# Patient Record
Sex: MALE | Race: WHITE | NOT HISPANIC OR LATINO | URBAN - NONMETROPOLITAN AREA
[De-identification: names, ages, dates, MRNs, and addresses within clinical notes are randomized per-mention and may not be internally consistent; named-entity substitution may affect disease eponyms.]

---

## 2018-07-02 PROBLEM — H52.4: Noted: 2018-07-02

## 2018-07-02 PROBLEM — H43.813: Noted: 2018-07-02

## 2018-07-02 PROBLEM — H10.413: Noted: 2018-07-02

## 2018-07-02 PROBLEM — H25.13: Noted: 2018-07-02

## 2019-07-08 ENCOUNTER — IMPORTED ENCOUNTER (OUTPATIENT)
Dept: URBAN - NONMETROPOLITAN AREA CLINIC 1 | Facility: CLINIC | Age: 59
End: 2019-07-08

## 2019-07-08 PROCEDURE — S0621 ROUTINE OPHTHALMOLOGICAL EXA: HCPCS

## 2019-07-08 PROCEDURE — 92310 CONTACT LENS FITTING OU: CPT

## 2019-07-08 NOTE — PATIENT DISCUSSION
Presbyopia OUDiscussed refractive status with patient. New glasses and contact Rx given today but do not recommend updating due to cataract progression. Discussed proper care replacement and hygiene. Continue to monitor. PVD OUDiscussed findings of exam in detail with the patient. The risk of retinal detachment in patients with PVDs was discussed with the patient and the warning signs of retinal detachment were carefully reviewed with the patient. The patient was warned to return to the office or contact the ophthalmologist on call immediately if they experience signs of retinal detachmentContinue to [place] OUDiscussed diagnosis with patient. Reviewed symptoms related to cataract progression. Discussed various treatment options with patient. Recommend refer to Dr. Rogerio Molina for evaluation. Patient agrees with plan will scheduleAllergies OUDiscussed diagnosis in detail with patient. Continue Bepreve BID OU/PRN. Continue to monitor.; 's Notes:  7/8/19DFE 9/21/17

## 2019-09-18 ENCOUNTER — IMPORTED ENCOUNTER (OUTPATIENT)
Dept: URBAN - NONMETROPOLITAN AREA CLINIC 1 | Facility: CLINIC | Age: 59
End: 2019-09-18

## 2019-09-18 PROBLEM — H43.813: Noted: 2019-09-18

## 2019-09-18 PROBLEM — H52.4: Noted: 2019-09-18

## 2019-09-18 PROBLEM — H25.13: Noted: 2019-09-18

## 2019-09-18 PROBLEM — H10.413: Noted: 2018-07-02

## 2019-09-18 PROCEDURE — 99214 OFFICE O/P EST MOD 30 MIN: CPT

## 2019-09-18 NOTE — PATIENT DISCUSSION
Cataract(s)-Visually significant cataract OU .-Cataract(s) causing symptomatic impairment of visual function not correctable with a tolerable change in glasses or contact lenses lighting or non-operative means resulting in specific activity limitations and/or participation restrictions including but not limited to reading viewing television driving or meeting vocational or recreational needs. -Expectation is clearer vision and functional improvement in symptoms as well as reduced glare disability after cataract removal.-Order IOLMaster and OPD today. -Recommend Toric IOL OU/ LenSx based on today's OPD testing and lifestyle questionnaire.-All questions were answered regarding surgery including pre and post-op medications appointments activity restrictions and anesthetic usage.-The risks benefits and alternatives and special risk factors for the patient were discussed in detail including but not limited to: bleeding infection retinal detachment vitreous loss problems with the implant and possible need for additional surgery.-Although rare the possibility of complete vision loss was discussed.-The possible need for glasses post-operatively was discussed.-Order medical clearance exam based on history of age-Patient elects to proceed with cataract surgery OD . Will schedule at patient's convenience and re-evaluate OS  in the future. **Doctor Recommendations**- Patient has more astigmatism OS than OD - Discsused LenSx surgery in detail with patient - Discussed Standard implants for OU - Discussed Toric implant OU based on today's testing however patient wore contacts into the office today. - Discussed the need for glasses up close patient states understanding. Informed patient he could wear a contact lens for up close if he wishes and do both eyes for distance. - Discussed monovision in detail with patient patient states he is happy with monovision with contacts and wishes to be out of contacts. 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 Discussed with patient if he does permanent monovision then he will be fixed on specific distances and will not have a range. - Target -2.25 OS for reading. - Patient elects to proceed with LenSx OU Toric OU OD distance OS near - Recommend patient d/c contacts for weekend take them out on Friday and come in on a Monday for repeat topography.  Patient ag; 's Notes: MR 7/8/19DFE 9/21/17

## 2019-10-28 ENCOUNTER — IMPORTED ENCOUNTER (OUTPATIENT)
Dept: URBAN - NONMETROPOLITAN AREA CLINIC 1 | Facility: CLINIC | Age: 59
End: 2019-10-28

## 2019-10-28 PROBLEM — E78.5: Noted: 2019-10-28

## 2019-10-28 PROBLEM — I10: Noted: 2019-10-28

## 2019-10-28 PROBLEM — Z01.818: Noted: 2019-10-28

## 2019-10-28 PROCEDURE — 99214 OFFICE O/P EST MOD 30 MIN: CPT

## 2019-10-28 NOTE — PATIENT DISCUSSION
Medical Clearance-Medical clearance done today. -No outstanding concerns that would preclude surgery.-Patient is cleared to proceed with scheduled surgery.; 's Notes: MR 7/8/19DFE 9/21/17

## 2019-11-06 ENCOUNTER — IMPORTED ENCOUNTER (OUTPATIENT)
Dept: URBAN - NONMETROPOLITAN AREA CLINIC 1 | Facility: CLINIC | Age: 59
End: 2019-11-06

## 2019-11-06 PROBLEM — H25.12: Noted: 2019-11-06

## 2019-11-06 PROBLEM — H26.491: Noted: 2019-11-06

## 2019-11-06 PROBLEM — Z98.41: Noted: 2019-11-06

## 2019-11-06 PROCEDURE — 99024 POSTOP FOLLOW-UP VISIT: CPT

## 2019-11-06 NOTE — PATIENT DISCUSSION
1 Day POV CE OD 11/5/19 Toric by LenSx- Discussed diagnosis in detail with patient- Patient is stable and doing well- Wound intact- Continue all post op drops as directed- PCO OD noted but stable and no treatment needed at this timr - Continue to monitor- RTC A/S with Dr. Mery Santos; 's Notes: MR 7/8/19DFE 9/21/17

## 2019-11-12 ENCOUNTER — IMPORTED ENCOUNTER (OUTPATIENT)
Dept: URBAN - NONMETROPOLITAN AREA CLINIC 1 | Facility: CLINIC | Age: 59
End: 2019-11-12

## 2019-11-12 PROCEDURE — 99024 POSTOP FOLLOW-UP VISIT: CPT

## 2019-11-12 PROCEDURE — 99214 OFFICE O/P EST MOD 30 MIN: CPT

## 2019-11-12 NOTE — PATIENT DISCUSSION
1 week POV CE OD Toric by LenSx (Distance)- OD looks good on today's exam - Patient is nearsighted and retina is stretched secondary to that. Reviewed mac OCT today with patient. No signs of retinal issues. - No residual astigmatism OD s/p CE OD. - Reviewed patients chart thoroughly and in detail with patient. - Discussed with patient that he is nearsighted s/p surgery. Discussed different options in detail with patient. - MR done today OD. -4.00 SPH.     - Discussed different sotelo and switching implants     - Discussed LASIK/Excimer laser @ N/C in 1 month for OD. Recommend this for OD.     - Discussed OD near and OS distance but I do not recommend this and recommend proceeding with keeping OD distance and OS near. - Recommend holding off on surgery OS at this time. - Repeat testing done OS today and numbers are different than testing done- Will discuss with Felix- Recommend repeat testing OS and keep it NEAR. - While we wait to do laser on the right eye may give patient contact lens in the mean time. Will need repeat brian and dilated MR for OD prior to scheduling laser. - Will make some glasses for patient (@ N/C) to help correct both eyes for distance. Will have them put new lenses in patients old frames. Will have Pradeep order lens.  Dasha Kelley to call patient tomorrow and discuss with patient when he was wearing contacts prior to testing repeat testing and today's testing.; 's Notes:  7/8/19DFE 9/21/17

## 2019-12-11 ENCOUNTER — IMPORTED ENCOUNTER (OUTPATIENT)
Dept: URBAN - NONMETROPOLITAN AREA CLINIC 1 | Facility: CLINIC | Age: 59
End: 2019-12-11

## 2019-12-11 NOTE — PATIENT DISCUSSION
1 Month POV CE OD Toric by LenSx (Distance)- OD looks good on today's exam - Patient is nearsighted and retina is stretched secondary to that. Reviewed mac    OCT previously with patient. No signs of retinal issues. - No residual astigmatism OD s/p CE OD. - Reviewed patients chart thoroughly and in detail with patient. - Discussed with patient that he is nearsighted s/p surgery. Discussed   different options in detail with patient. - MR done today OD. -4.00 SPH.      - Discussed different sotelo and switching implants     - Discussed LASIK/Excimer laser @ N/C in 1 month for OD. Recommend this for        OD. - Repeat testing done OS today and numbers are consistent from previous. - Will discuss with Felix. Undilated MR done today per Felix request. Will realy      this info to Felix-Discussed with patient we can correct OD for near and when I do cataract  surgery I can do OS distance. Also discussed doing OD for distance and OS for Near. Patient expressed he has no preference he states he wants Mono vision but does not have a preference on which eye is set for distance and which is for near.-I recommend doing [address] OD and target -2.25 @ no charge. Discussed this with patient and Geno Shipman present in the exam room along with Donavan Louis. Discussed proceeding with CE OS and correcting for distance. Patient stated that was fine. We will do [address] OD first before cataract surgery OS. Patient still qualifys for Toric/ LenSx OS. -Written Valium RX and Vicodin RX was given today by Donavan Louis. Instuctions to not take either until day of procedure and to bring in with him he expressed understanding. Patient was very pleased at todays visit with the staff and the treatment he is receiving. Informed patient his satisfication and vision is our main concern.  ****Target -2.25 OD with PRK/ OS DISTANCE with Toric/LenSx after PRK OD******; 's Notes: MR 7/8/19DFE 9/21/17

## 2019-12-12 PROBLEM — H26.491: Noted: 2019-12-12

## 2019-12-12 PROBLEM — H25.12: Noted: 2019-12-12

## 2019-12-12 PROBLEM — Z98.41: Noted: 2019-11-06

## 2019-12-18 ENCOUNTER — IMPORTED ENCOUNTER (OUTPATIENT)
Dept: URBAN - NONMETROPOLITAN AREA CLINIC 1 | Facility: CLINIC | Age: 59
End: 2019-12-18

## 2019-12-18 NOTE — PATIENT DISCUSSION
[address] enhacement OD - Near [place]- discussed risk and benefits with patient- Before procedure discussed with patient that this will now be his reading eye and when we proceed with cataract surgery on the left eye it will be his Distance eye. Patient voiced understanding. Wants to proceed with [address] surgery. [address] done w/o any complications. patient to come back for 1 day POV then daily until BCL is removed. see Dr. Colin Reyes in one month for f/u and then will schedule cataract surgery at that time if we are ready to proceed. patient voiced understanding. --------------- notes below are from last visit --------------------1 Month POV CE OD Toric by LenSx (Distance)- OD looks good on today's exam - Patient is nearsighted and retina is stretched secondary to that. Reviewed mac    OCT previously with patient. No signs of retinal issues. - No residual astigmatism OD s/p CE OD. - Reviewed patients chart thoroughly and in detail with patient. - Discussed with patient that he is nearsighted s/p surgery. Discussed   different options in detail with patient. - MR done today OD. -4.00 SPH.      - Discussed different sotelo and switching implants     - Discussed LASIK/Excimer laser @ N/C in 1 month for OD. Recommend this for        OD. - Repeat testing done OS today and numbers are consistent from previous. - Will discuss with Felix. Undilated MR done today per Felix request. Will realy      this info to Felix-Discussed with patient we can correct OD for near and when I do cataract  surgery I can do OS distance. Also discussed doing OD for distance and OS for Near. Patient expressed he has no preference he states he wants Mono vision but does not have a preference on which eye is set for distance and which is for near.-I recommend doing [address] OD and target -2.25 @ no charge. Discussed this with patient and Taryn West present in the exam room along with Valentina Chandler. Discussed proceeding with CE OS and correcting for distance. Patient stated that was fine. We will do [address] OD first before cataract surgery OS. Patient still qualifys for Toric/ LenSx OS. -Written Valium RX and Vicodin RX was given today by Valentina Chandler. Instuctions to not take either until day of procedure and to bring in with him he expressed understanding. Patient was very pleased at todays visit with the staff and the treatment he is receiving. Informed patient his satisfication and vision is our main concern.  ****Target -2.25 OD with PRK/ OS DISTANCE with Toric/LenSx after PRK OD******; 's Notes: MR 7/8/19DFE 9/21/17

## 2019-12-19 ENCOUNTER — IMPORTED ENCOUNTER (OUTPATIENT)
Dept: URBAN - NONMETROPOLITAN AREA CLINIC 1 | Facility: CLINIC | Age: 59
End: 2019-12-19

## 2019-12-19 PROBLEM — H26.491: Noted: 2019-12-12

## 2019-12-19 PROBLEM — H25.12: Noted: 2019-12-12

## 2019-12-19 PROBLEM — Z98.890: Noted: 2019-12-19

## 2019-12-19 PROCEDURE — 99024 POSTOP FOLLOW-UP VISIT: CPT

## 2019-12-19 NOTE — PATIENT DISCUSSION
1 day PO PRK OD-  discussed findings w/ pt today-  signs/symptoms associated discussed-  BCL in place will leave there today-  continue postop regimen-  RTC Monday with Rhodes--------------- notes below are from last visit --------------------1 Month POV CE OD Toric by LenSx (Distance)- OD looks good on today's exam - Patient is nearsighted and retina is stretched secondary to that. Reviewed mac    OCT previously with patient. No signs of retinal issues. - No residual astigmatism OD s/p CE OD. - Reviewed patients chart thoroughly and in detail with patient. - Discussed with patient that he is nearsighted s/p surgery. Discussed   different options in detail with patient. - MR done today OD. -4.00 SPH.      - Discussed different sotelo and switching implants     - Discussed LASIK/Excimer laser @ N/C in 1 month for OD. Recommend this for        OD. - Repeat testing done OS today and numbers are consistent from previous. - Will discuss with Felix. Undilated MR done today per Felix request. Will realy      this info to Felix-Discussed with patient we can correct OD for near and when I do cataract  surgery I can do OS distance. Also discussed doing OD for distance and OS for Near. Patient expressed he has no preference he states he wants Mono vision but does not have a preference on which eye is set for distance and which is for near.-I recommend doing [address] OD and target -2.25 @ no charge. Discussed this with patient and Yovanny Vivar present in the exam room along with Jennifer Carlisle. Discussed proceeding with CE OS and correcting for distance. Patient stated that was fine. We will do [address] OD first before cataract surgery OS. Patient still qualifys for Toric/ LenSx OS. -Written Valium RX and Vicodin RX was given today by Jennifer Carlisle. Instuctions to not take either until day of procedure and to bring in with him he expressed understanding. Patient was very pleased at todays visit with the staff and the treatment he is receiving. Informed patient his satisfication and vision is our main concern.  ****Target -2.25 OD with PRK/ OS DISTANCE with Toric/LenSx after PRK OD******; 's Notes: MR 7/8/19DFE 9/21/17
Adequate: hears normal conversation without difficulty

## 2019-12-23 ENCOUNTER — IMPORTED ENCOUNTER (OUTPATIENT)
Dept: URBAN - NONMETROPOLITAN AREA CLINIC 1 | Facility: CLINIC | Age: 59
End: 2019-12-23

## 2019-12-23 PROBLEM — Z98.890: Noted: 2019-12-23

## 2019-12-23 PROBLEM — H25.812: Noted: 2020-01-29

## 2019-12-23 PROBLEM — H52.11: Noted: 2019-12-23

## 2019-12-23 PROCEDURE — 99024 POSTOP FOLLOW-UP VISIT: CPT

## 2019-12-23 NOTE — PATIENT DISCUSSION
5 day POV PRK OD 12/18/19  (near vision)- Discussed diagnosis in detail with patient. - Patient doing well. - Replaced BCL OD today. - Trial CL given today for OS for distance. (B&L Ultra for Astigmatism)- Patient to come back for 1 day POV then daily until BCL is removed. - See Dr. Calvin Benito in one month for f/u and then will schedule cataract surgery at that time if we are ready to proceed- Continue post op drops as directed. - Continue to monitor.- F/U Thurs or Friday for POV --------------- notes below are from last visit --------------------1 Month POV CE OD Toric by LenSx (Distance)- OD looks good on today's exam - Patient is nearsighted and retina is stretched secondary to that. Reviewed mac    OCT previously with patient. No signs of retinal issues. - No residual astigmatism OD s/p CE OD. - Reviewed patients chart thoroughly and in detail with patient. - Discussed with patient that he is nearsighted s/p surgery. Discussed   different options in detail with patient. - MR done today OD. -4.00 SPH.      - Discussed different sotelo and switching implants     - Discussed LASIK/Excimer laser @ [facility] in 1 month for OD. Recommend this for        OD. - Repeat testing done OS today and numbers are consistent from previous. - Will discuss with Felix. Undilated MR done today per Felix request. Will realy      this info to Felix-Discussed with patient we can correct OD for near and when I do cataract  surgery I can do OS distance. Also discussed doing OD for distance and OS for Near. Patient expressed he has no preference he states he wants Mono vision but does not have a preference on which eye is set for distance and which is for near.-I recommend doing [address] OD and target -2.25 @ no charge. Discussed this with patient and Vahe Bill present in the exam room along with Yanci Keita. Discussed proceeding with CE OS and correcting for distance. Patient stated that was fine. We will do [address] OD first before cataract surgery OS. Patient still qualifys for Toric/ LenSx OS. -Written Valium RX and Vicodin RX was given today by Yanci Keita. Instuctions to not take either until day of procedure and to bring in with him he expressed understanding. Patient was very pleased at todays visit with the staff and the treatment he is receiving. Informed patient his satisfication and vision is our main concern.  ****Target -2.25 OD with PRK/ OS DISTANCE with Toric/LenSx after PRK OD******; 's Notes: MR 7/8/19DFE 9/21/17

## 2019-12-27 ENCOUNTER — IMPORTED ENCOUNTER (OUTPATIENT)
Dept: URBAN - NONMETROPOLITAN AREA CLINIC 1 | Facility: CLINIC | Age: 59
End: 2019-12-27

## 2019-12-27 PROCEDURE — 99024 POSTOP FOLLOW-UP VISIT: CPT

## 2019-12-27 NOTE — PATIENT DISCUSSION
9 day POV PRK OD 12/18/19  (near vision)- Discussed diagnosis in detail with patient. - Patient is stable and doing well. - Replaced BCL OD today with B&L Ultra -2.00 BC 8.5 [place] 14.2 LOT [id number] EX[facility] See Dr. Oral Merritt in one month for f/u and then will schedule cataract surgery at that time if we are ready to proceed- Continue post op drops as directed explained to patient that he should be tapering drops - Continue to monitor.- RTC Monday for F/U --------------- notes below are from last visit --------------------1 Month POV CE OD Toric by LenSx (Distance)- OD looks good on today's exam - Patient is nearsighted and retina is stretched secondary to that. Reviewed mac    OCT previously with patient. No signs of retinal issues. - No residual astigmatism OD s/p CE OD. - Reviewed patients chart thoroughly and in detail with patient. - Discussed with patient that he is nearsighted s/p surgery. Discussed   different options in detail with patient. - MR done today OD. -4.00 SPH.      - Discussed different sotelo and switching implants     - Discussed LASIK/Excimer laser @ N/C in 1 month for OD. Recommend this for        OD. - Repeat testing done OS today and numbers are consistent from previous. - Will discuss with Felix. Undilated MR done today per Felix request. Will realy      this info to Felix-Discussed with patient we can correct OD for near and when I do cataract  surgery I can do OS distance. Also discussed doing OD for distance and OS for Near. Patient expressed he has no preference he states he wants Mono vision but does not have a preference on which eye is set for distance and which is for near.-I recommend doing [address] OD and target -2.25 @ no charge. Discussed this with patient and Barb Puri present in the exam room along with Carmelita Nobles. Discussed proceeding with CE OS and correcting for distance. Patient stated that was fine. We will do [address] OD first before cataract surgery OS. Patient still qualifys for Toric/ LenSx OS. -Written Valium RX and Vicodin RX was given today by Carmelita Nobles. Instuctions to not take either until day of procedure and to bring in with him he expressed understanding. Patient was very pleased at todays visit with the staff and the treatment he is receiving. Informed patient his satisfication and vision is our main concern.  ****Target -2.25 OD with PRK/ OS DISTANCE with Toric/LenSx after PRK OD******; 's Notes: MR 7/8/19DFE 9/21/17

## 2019-12-30 ENCOUNTER — IMPORTED ENCOUNTER (OUTPATIENT)
Dept: URBAN - NONMETROPOLITAN AREA CLINIC 1 | Facility: CLINIC | Age: 59
End: 2019-12-30

## 2019-12-30 PROCEDURE — 99024 POSTOP FOLLOW-UP VISIT: CPT

## 2019-12-30 PROCEDURE — 92071 CONTACT LENS FITTING FOR TX: CPT

## 2019-12-30 NOTE — PATIENT DISCUSSION
9 day POV PRK OD 12/18/19  (near vision)- Discussed diagnosis in detail with patient.- Replaced BCL OD today with B&L Ultra -2.00 BC 8.5 [place] 14.2 LOT [id number] EXP [facility] 20[facility]25- Also gave patient a B&L Ultra Toric lenses for OS as well- See Dr. Colin Reyes in one month for f/u and then will schedule cataract surgery at that time if we are ready to proceed- Continue post op drops as directed explained to patient that he should be tapering drops - Continue to monitor.- RTC 1 week for F/U --------------- notes below are from last visit --------------------1 Month POV CE OD Toric by LenSx (Distance)- OD looks good on today's exam - Patient is nearsighted and retina is stretched secondary to that. Reviewed mac    OCT previously with patient. No signs of retinal issues. - No residual astigmatism OD s/p CE OD. - Reviewed patients chart thoroughly and in detail with patient. - Discussed with patient that he is nearsighted s/p surgery. Discussed   different options in detail with patient. - MR done today OD. -4.00 SPH.      - Discussed different sotelo and switching implants     - Discussed LASIK/Excimer laser @ N/[facility] in 1 month for OD. Recommend this for        OD. - Repeat testing done OS today and numbers are consistent from previous. - Will discuss with Felix. Undilated MR done today per Felix request. Will realy      this info to Felix-Discussed with patient we can correct OD for near and when I do cataract  surgery I can do OS distance. Also discussed doing OD for distance and OS for Near. Patient expressed he has no preference he states he wants Mono vision but does not have a preference on which eye is set for distance and which is for near.-I recommend doing [address] OD and target -2.25 @ no charge. Discussed this with patient and Taryn West present in the exam room along with Valentina Chandler. Discussed proceeding with CE OS and correcting for distance. Patient stated that was fine. We will do [address] OD first before cataract surgery OS. Patient still qualifys for Toric/ LenSx OS. -Written Valium RX and Vicodin RX was given today by Valentina Chandler. Instuctions to not take either until day of procedure and to bring in with him he expressed understanding. Patient was very pleased at todays visit with the staff and the treatment he is receiving. Informed patient his satisfication and vision is our main concern.  ****Target -2.25 OD with PRK/ OS DISTANCE with Toric/LenSx after PRK OD******; 's Notes: MR 7/8/19DFE 9/21/17

## 2020-01-06 ENCOUNTER — IMPORTED ENCOUNTER (OUTPATIENT)
Dept: URBAN - NONMETROPOLITAN AREA CLINIC 1 | Facility: CLINIC | Age: 60
End: 2020-01-06

## 2020-01-06 PROCEDURE — V2521 CNTCT LENS HYDROPHILIC TORIC: HCPCS

## 2020-01-06 PROCEDURE — V2520 CONTACT LENS HYDROPHILIC: HCPCS

## 2020-01-06 NOTE — PATIENT DISCUSSION
9 day POV PRK OD 12/18/19  (near vision)- Discussed diagnosis in detail with patient. - Patient okay with OD B&L Ultra -2.00 - Also gave patient a B&L Ultra Toric lenses for OS as well -3.00 -2.25 x 180- See Dr. Dania Krishnan in one month for f/u and then will schedule cataract surgery at that time if we are ready to proceed- Continue tapering drops- Explained to patient not to wear CLS for 3-4 days before seeing Dr. Dwight Rahman - Continue to monitor.- N/A with Dr. Dania Krishnan for cataract eval OS--------------- notes below are from last visit --------------------1 Month POV CE OD Toric by LenSx (Distance)- OD looks good on today's exam - Patient is nearsighted and retina is stretched secondary to that. Reviewed mac    OCT previously with patient. No signs of retinal issues. - No residual astigmatism OD s/p CE OD. - Reviewed patients chart thoroughly and in detail with patient. - Discussed with patient that he is nearsighted s/p surgery. Discussed   different options in detail with patient. - MR done today OD. -4.00 SPH.      - Discussed different sotelo and switching implants     - Discussed LASIK/Excimer laser @ [facility] in 1 month for OD. Recommend this for        OD. - Repeat testing done OS today and numbers are consistent from previous. - Will discuss with Felix. Undilated MR done today per Felix request. Will realy      this info to Felix-Discussed with patient we can correct OD for near and when I do cataract  surgery I can do OS distance. Also discussed doing OD for distance and OS for Near. Patient expressed he has no preference he states he wants Mono vision but does not have a preference on which eye is set for distance and which is for near.-I recommend doing [address] OD and target -2.25 @ no charge. Discussed this with patient and Jana Ceballos present in the exam room along with Isreal Hubbard. Discussed proceeding with CE OS and correcting for distance. Patient stated that was fine. We will do [address] OD first before cataract surgery OS. Patient still qualifys for Toric/ LenSx OS. -Written Valium RX and Vicodin RX was given today by Isreal Hubbard. Instuctions to not take either until day of procedure and to bring in with him he expressed understanding. Patient was very pleased at todays visit with the staff and the treatment he is receiving. Informed patient his satisfication and vision is our main concern.  ****Target -2.25 OD with PRK/ OS DISTANCE with Toric/LenSx after PRK OD******; 's Notes: MR 7/8/19DFE 9/21/17

## 2020-01-29 ENCOUNTER — IMPORTED ENCOUNTER (OUTPATIENT)
Dept: URBAN - NONMETROPOLITAN AREA CLINIC 1 | Facility: CLINIC | Age: 60
End: 2020-01-29

## 2020-01-29 PROCEDURE — 99024 POSTOP FOLLOW-UP VISIT: CPT

## 2020-01-29 NOTE — PATIENT DISCUSSION
1.5 Month POV s/p ORK OD -Patient is doing well s/p-OD looks well on todays exam -Patient has been wearing CL to correct him for distance. Dicussed this in exam room today with Dr. Venus Seals present as well who gave him the contact lens. -[facility] checked today 20/20 uncorrected.  -Patient come back in n/a for repeat cat eval OS and shoot for -2.25 OS for distance; 's Notes: MR 7/8/19DFE 9/21/17

## 2020-02-04 ENCOUNTER — IMPORTED ENCOUNTER (OUTPATIENT)
Dept: URBAN - NONMETROPOLITAN AREA CLINIC 1 | Facility: CLINIC | Age: 60
End: 2020-02-04

## 2020-02-04 PROCEDURE — 99024 POSTOP FOLLOW-UP VISIT: CPT

## 2020-02-04 NOTE — PATIENT DISCUSSION
1.5 Month POV s/p ORK OD -Patient is doing well s/p-OD looks well on todays exam Cataract(s)-Visually significant cataract OS . -Cataract(s) causing symptomatic impairment of visual function not correctable with a tolerable change in glasses or contact lenses lighting or non-operative means resulting in specific activity limitations and/or participation restrictions including but not limited to reading viewing television driving or meeting vocational or recreational needs. -Expectation is clearer vision and functional improvement in symptoms as well as reduced glare disability after cataract removal.-Order IOLMaster and OPD today. -Recommend Toric IOL (Target -2.00) based on today's OPD testing and lifestyle questionnaire.-All questions were answered regarding surgery including pre and post-op medications appointments activity restrictions and anesthetic usage.-The risks benefits and alternatives and special risk factors for the patient were discussed in detail including but not limited to: bleeding infection retinal detachment vitreous loss problems with the implant and possible need for additional surgery.-Although rare the possibility of complete vision loss was discussed.-The possible need for glasses post-operatively was discussed.-Order medical clearance exam based on history of age-Patient elects to proceed with cataract surgery OS .  Will schedule at patient's convenience *Discussed Toric implant OS *Recommend target OS -2.00; 's Notes: MR 7/8/19DFE 9/21/17

## 2020-02-17 ENCOUNTER — IMPORTED ENCOUNTER (OUTPATIENT)
Dept: URBAN - NONMETROPOLITAN AREA CLINIC 1 | Facility: CLINIC | Age: 60
End: 2020-02-17

## 2020-02-17 PROBLEM — I10: Noted: 2020-02-17

## 2020-02-17 PROBLEM — E78.5: Noted: 2020-02-17

## 2020-02-17 PROBLEM — Z01.818: Noted: 2020-02-17

## 2020-03-11 ENCOUNTER — IMPORTED ENCOUNTER (OUTPATIENT)
Dept: URBAN - NONMETROPOLITAN AREA CLINIC 1 | Facility: CLINIC | Age: 60
End: 2020-03-11

## 2020-03-11 PROBLEM — H26.491: Noted: 2020-03-11

## 2020-03-11 PROBLEM — Z98.42: Noted: 2020-03-11

## 2020-03-11 PROBLEM — Z98.41: Noted: 2020-03-11

## 2020-03-11 PROCEDURE — 99024 POSTOP FOLLOW-UP VISIT: CPT

## 2020-03-11 NOTE — PATIENT DISCUSSION
1 day POV CE OS 3/10/20 Toric by [employer] (near vision)- Discussed diagnosis in detail with patient- Patient is stable and doing well- Wound intact- Continue all post op drops as directed- PCO OD noted but stable and no treatment needed at this time - Instilled a drop of Alphagan in OS today pressure was 23- Recommend wearing CL in for next visit - Continue to monitor- RTC 1 week POV; 's Notes: MR 7/8/19DFE 9/21/17

## 2020-03-16 ENCOUNTER — IMPORTED ENCOUNTER (OUTPATIENT)
Dept: URBAN - NONMETROPOLITAN AREA CLINIC 1 | Facility: CLINIC | Age: 60
End: 2020-03-16

## 2020-03-16 PROCEDURE — V2520 CONTACT LENS HYDROPHILIC: HCPCS

## 2020-03-16 PROCEDURE — 99024 POSTOP FOLLOW-UP VISIT: CPT

## 2020-03-16 NOTE — PATIENT DISCUSSION
6 day POV CE OS 3/10/20 Toric by [employer] (near vision)- Discussed diagnosis in detail with patient- Patient is stable and doing well- Wound intact- Continue all post op drops as directed- PCO OD noted but stable and no treatment needed at this time - Continue wearing CL for OD CL Rx given today- Continue to monitor- RTC 3 week POV; 's Notes: MR 7/8/19DFE 9/21/17

## 2022-04-09 ASSESSMENT — VISUAL ACUITY
OS_GLARE: 20/40-2
OD_GLARE: 20/40+2
OD_SC: 20/30
OD_SC: 20/20
OD_PH: 20/100
OD_CC: 20/20
OS_AM: 20/20
OS_SC: 20/400 SC
OS_SC: 20/50+1
OS_SC: 20/50
OS_PH: 20/30
OS_SC: 20/30+
OD_CC: 20/400
OD_PH: 20/100+
OS_AM: 20/20
OS_CC: CF4'
OD_GLARE: 20/40+2
OS_GLARE: 20/40-2
OS_SC: 20/50+1
OS_PH: 20/100
OS_CC: 20/400
OD_SC: 20/22-2
OS_PH: 20/40
OS_GLARE: 20/40-2
OD_CC: 20/20
OD_PH: 20/200
OD_SC: 20/22-2
OS_SC: 20/50+1
OD_CC: 20/50-
OS_GLARE: 20/50
OU_SC: J2+
OS_PH: 20/40
OD_SC: 20/22-2
OS_AM: 20/20
OD_PH: 20/200
OS_SC: 20/30+
OS_CC: 20/400
OU_SC: 20/30
OD_SC: 20/22-2
OS_PH: 20/50-
OS_SC: 20/50+1
OD_PH: 20/30-
OS_CC: 20/63-
OD_CC: 20/200-
OS_CC: CF4'
OS_CC: 20/30+
OD_CC: 20/400
OD_CC: 20/100+
OD_PAM: 20/20
OS_PH: 20/63-
OS_CC: CF4'
OS_SC: 20/30+
OD_SC: 20/30-
OS_SC: 20/30
OD_PH: 20/29-
OS_SC: 20/30
OS_GLARE: 20/50
OD_CC: 20/400 BLURRY
OS_PH: 20/40
OS_SC: 20/30+
OS_CC: 20/40-2
OD_SC: 20/20-

## 2022-04-09 ASSESSMENT — TONOMETRY
OD_IOP_MMHG: 16
OD_IOP_MMHG: 18
OS_IOP_MMHG: 12
OS_IOP_MMHG: 15
OD_IOP_MMHG: 16
OS_IOP_MMHG: 23
OS_IOP_MMHG: 17
OS_IOP_MMHG: 17
OS_IOP_MMHG: 15
OS_IOP_MMHG: 16
OD_IOP_MMHG: 17
OS_IOP_MMHG: 14
OD_IOP_MMHG: 17

## 2022-04-09 ASSESSMENT — PACHYMETRY
OD_CT_UM: 564; ADJ: WNL
OS_CT_UM: 564; ADJ: WNL
OD_CT_UM: 564; ADJ: WNL
OS_CT_UM: 564; ADJ: WNL
OS_CT_UM: 564; ADJ: WNL
OD_CT_UM: 564; ADJ: WNL
OD_CT_UM: 564; ADJ: WNL
OS_CT_UM: 564; ADJ: WNL
OS_CT_UM: 564; ADJ: WNL
OD_CT_UM: 564; ADJ: WNL
OS_CT_UM: 564; ADJ: WNL
OS_CT_UM: 564; ADJ: WNL
OD_CT_UM: 564; ADJ: WNL
OS_CT_UM: 564; ADJ: WNL

## 2022-09-06 ENCOUNTER — ESTABLISHED PATIENT (OUTPATIENT)
Dept: URBAN - NONMETROPOLITAN AREA CLINIC 1 | Facility: CLINIC | Age: 62
End: 2022-09-06

## 2022-09-06 DIAGNOSIS — H26.491: ICD-10-CM

## 2022-09-06 DIAGNOSIS — H52.4: ICD-10-CM

## 2022-09-06 PROCEDURE — 92015 DETERMINE REFRACTIVE STATE: CPT

## 2022-09-06 PROCEDURE — 99213 OFFICE O/P EST LOW 20 MIN: CPT

## 2022-09-06 PROCEDURE — 92310-E CONTACT LENS FITTING ESTABLISH PATIENT

## 2022-09-06 ASSESSMENT — VISUAL ACUITY
OD_PH: 20/60
OS_SC: 20/80
OD_SC: 20/70
OS_PH: 20/50

## 2022-09-06 ASSESSMENT — TONOMETRY
OS_IOP_MMHG: 15
OD_IOP_MMHG: 15

## 2022-09-06 NOTE — PATIENT DISCUSSION
Nayan Caldwell came to me and stated patient likes CLS and needed RX finalized. CL RX finalized and printed for optical. PDS.

## 2022-09-06 NOTE — PATIENT DISCUSSION
Patient given Trial lens for CL in OD. Patient to call Dr. Wilma Cobb and let him know how he likes it so we can finalize.

## 2023-10-23 ENCOUNTER — FOLLOW UP (OUTPATIENT)
Dept: URBAN - NONMETROPOLITAN AREA CLINIC 1 | Facility: CLINIC | Age: 63
End: 2023-10-23

## 2023-10-23 DIAGNOSIS — H52.4: ICD-10-CM

## 2023-10-23 PROCEDURE — S0621 ROUTINE OPHTHALMOLOGICAL EXA: HCPCS

## 2023-10-23 PROCEDURE — 92310-E CONTACT LENS FITTING ESTABLISH PATIENT

## 2023-10-23 ASSESSMENT — VISUAL ACUITY
OU_SC: 20/70
OS_SC: 20/200
OD_SC: 20/200
OS_PH: 20/40
OD_PH: 20/60

## 2023-10-23 ASSESSMENT — TONOMETRY
OD_IOP_MMHG: 15
OS_IOP_MMHG: 14

## 2025-02-10 ENCOUNTER — COMPREHENSIVE EXAM (OUTPATIENT)
Age: 65
End: 2025-02-10

## 2025-02-10 DIAGNOSIS — H52.4: ICD-10-CM

## 2025-02-10 PROCEDURE — 92310-1 LEVEL 1 SOFT LENS UPDATE

## 2025-02-10 PROCEDURE — 92014 COMPRE OPH EXAM EST PT 1/>: CPT

## 2025-02-10 PROCEDURE — 92015 DETERMINE REFRACTIVE STATE: CPT
